# Patient Record
Sex: FEMALE | Race: WHITE | NOT HISPANIC OR LATINO | URBAN - METROPOLITAN AREA
[De-identification: names, ages, dates, MRNs, and addresses within clinical notes are randomized per-mention and may not be internally consistent; named-entity substitution may affect disease eponyms.]

---

## 2022-12-24 ENCOUNTER — EMERGENCY (EMERGENCY)
Facility: HOSPITAL | Age: 47
LOS: 1 days | Discharge: HOME | End: 2022-12-24
Attending: INTERNAL MEDICINE | Admitting: INTERNAL MEDICINE
Payer: MEDICAID

## 2022-12-24 VITALS
SYSTOLIC BLOOD PRESSURE: 160 MMHG | HEART RATE: 105 BPM | DIASTOLIC BLOOD PRESSURE: 117 MMHG | WEIGHT: 119.93 LBS | TEMPERATURE: 98 F | OXYGEN SATURATION: 98 % | HEIGHT: 64 IN | RESPIRATION RATE: 17 BRPM

## 2022-12-24 VITALS
DIASTOLIC BLOOD PRESSURE: 91 MMHG | OXYGEN SATURATION: 98 % | HEART RATE: 86 BPM | RESPIRATION RATE: 16 BRPM | SYSTOLIC BLOOD PRESSURE: 150 MMHG

## 2022-12-24 DIAGNOSIS — Z41.9 ENCOUNTER FOR PROCEDURE FOR PURPOSES OTHER THAN REMEDYING HEALTH STATE, UNSPECIFIED: Chronic | ICD-10-CM

## 2022-12-24 LAB
ALBUMIN SERPL ELPH-MCNC: 4.9 G/DL — SIGNIFICANT CHANGE UP (ref 3.3–5)
ALP SERPL-CCNC: 66 U/L — SIGNIFICANT CHANGE UP (ref 40–120)
ALT FLD-CCNC: 14 U/L — SIGNIFICANT CHANGE UP (ref 12–78)
ANION GAP SERPL CALC-SCNC: 11 MMOL/L — SIGNIFICANT CHANGE UP (ref 5–17)
AST SERPL-CCNC: 12 U/L — LOW (ref 15–37)
BILIRUB SERPL-MCNC: 1.3 MG/DL — HIGH (ref 0.2–1.2)
BUN SERPL-MCNC: 20 MG/DL — SIGNIFICANT CHANGE UP (ref 7–23)
CALCIUM SERPL-MCNC: 9.2 MG/DL — SIGNIFICANT CHANGE UP (ref 8.5–10.1)
CHLORIDE SERPL-SCNC: 104 MMOL/L — SIGNIFICANT CHANGE UP (ref 96–108)
CO2 SERPL-SCNC: 23 MMOL/L — SIGNIFICANT CHANGE UP (ref 22–31)
CREAT SERPL-MCNC: 0.61 MG/DL — SIGNIFICANT CHANGE UP (ref 0.5–1.3)
EGFR: 110 ML/MIN/1.73M2 — SIGNIFICANT CHANGE UP
GLUCOSE SERPL-MCNC: 150 MG/DL — HIGH (ref 70–99)
HCG SERPL-ACNC: <1 MIU/ML — SIGNIFICANT CHANGE UP
HCT VFR BLD CALC: 41 % — SIGNIFICANT CHANGE UP (ref 34.5–45)
HGB BLD-MCNC: 13.5 G/DL — SIGNIFICANT CHANGE UP (ref 11.5–15.5)
MCHC RBC-ENTMCNC: 28.8 PG — SIGNIFICANT CHANGE UP (ref 27–34)
MCHC RBC-ENTMCNC: 32.9 GM/DL — SIGNIFICANT CHANGE UP (ref 32–36)
MCV RBC AUTO: 87.6 FL — SIGNIFICANT CHANGE UP (ref 80–100)
NRBC # BLD: 0 /100 WBCS — SIGNIFICANT CHANGE UP (ref 0–0)
PLATELET # BLD AUTO: 335 K/UL — SIGNIFICANT CHANGE UP (ref 150–400)
POTASSIUM SERPL-MCNC: 3 MMOL/L — LOW (ref 3.5–5.3)
POTASSIUM SERPL-SCNC: 3 MMOL/L — LOW (ref 3.5–5.3)
PROT SERPL-MCNC: 8 G/DL — SIGNIFICANT CHANGE UP (ref 6–8.3)
RBC # BLD: 4.68 M/UL — SIGNIFICANT CHANGE UP (ref 3.8–5.2)
RBC # FLD: 13.6 % — SIGNIFICANT CHANGE UP (ref 10.3–14.5)
SARS-COV-2 RNA SPEC QL NAA+PROBE: SIGNIFICANT CHANGE UP
SODIUM SERPL-SCNC: 138 MMOL/L — SIGNIFICANT CHANGE UP (ref 135–145)
TROPONIN I, HIGH SENSITIVITY RESULT: 24.1 NG/L — SIGNIFICANT CHANGE UP
WBC # BLD: 14.38 K/UL — HIGH (ref 3.8–10.5)
WBC # FLD AUTO: 14.38 K/UL — HIGH (ref 3.8–10.5)

## 2022-12-24 PROCEDURE — 84484 ASSAY OF TROPONIN QUANT: CPT

## 2022-12-24 PROCEDURE — 93010 ELECTROCARDIOGRAM REPORT: CPT

## 2022-12-24 PROCEDURE — 70450 CT HEAD/BRAIN W/O DYE: CPT | Mod: MA

## 2022-12-24 PROCEDURE — 84702 CHORIONIC GONADOTROPIN TEST: CPT

## 2022-12-24 PROCEDURE — 99285 EMERGENCY DEPT VISIT HI MDM: CPT | Mod: 25

## 2022-12-24 PROCEDURE — 80053 COMPREHEN METABOLIC PANEL: CPT

## 2022-12-24 PROCEDURE — 85027 COMPLETE CBC AUTOMATED: CPT

## 2022-12-24 PROCEDURE — 93005 ELECTROCARDIOGRAM TRACING: CPT

## 2022-12-24 PROCEDURE — 96375 TX/PRO/DX INJ NEW DRUG ADDON: CPT

## 2022-12-24 PROCEDURE — 70450 CT HEAD/BRAIN W/O DYE: CPT | Mod: 26,MA

## 2022-12-24 PROCEDURE — 36415 COLL VENOUS BLD VENIPUNCTURE: CPT

## 2022-12-24 PROCEDURE — 87635 SARS-COV-2 COVID-19 AMP PRB: CPT

## 2022-12-24 PROCEDURE — 96374 THER/PROPH/DIAG INJ IV PUSH: CPT

## 2022-12-24 PROCEDURE — 99285 EMERGENCY DEPT VISIT HI MDM: CPT

## 2022-12-24 RX ORDER — POTASSIUM CHLORIDE 20 MEQ
40 PACKET (EA) ORAL ONCE
Refills: 0 | Status: COMPLETED | OUTPATIENT
Start: 2022-12-24 | End: 2022-12-24

## 2022-12-24 RX ORDER — SUMATRIPTAN SUCCINATE 4 MG/.5ML
6 INJECTION, SOLUTION SUBCUTANEOUS
Qty: 1 | Refills: 0
Start: 2022-12-24

## 2022-12-24 RX ORDER — HYDRALAZINE HCL 50 MG
10 TABLET ORAL ONCE
Refills: 0 | Status: COMPLETED | OUTPATIENT
Start: 2022-12-24 | End: 2022-12-24

## 2022-12-24 RX ORDER — MORPHINE SULFATE 50 MG/1
4 CAPSULE, EXTENDED RELEASE ORAL ONCE
Refills: 0 | Status: DISCONTINUED | OUTPATIENT
Start: 2022-12-24 | End: 2022-12-24

## 2022-12-24 RX ORDER — OXYCODONE AND ACETAMINOPHEN 5; 325 MG/1; MG/1
1 TABLET ORAL
Qty: 20 | Refills: 0
Start: 2022-12-24 | End: 2022-12-28

## 2022-12-24 RX ORDER — ONDANSETRON 8 MG/1
4 TABLET, FILM COATED ORAL ONCE
Refills: 0 | Status: COMPLETED | OUTPATIENT
Start: 2022-12-24 | End: 2022-12-24

## 2022-12-24 RX ORDER — METOCLOPRAMIDE HCL 10 MG
10 TABLET ORAL ONCE
Refills: 0 | Status: COMPLETED | OUTPATIENT
Start: 2022-12-24 | End: 2022-12-24

## 2022-12-24 RX ORDER — AMLODIPINE BESYLATE 2.5 MG/1
1 TABLET ORAL
Qty: 30 | Refills: 0
Start: 2022-12-24 | End: 2023-01-22

## 2022-12-24 RX ADMIN — MORPHINE SULFATE 4 MILLIGRAM(S): 50 CAPSULE, EXTENDED RELEASE ORAL at 05:44

## 2022-12-24 RX ADMIN — MORPHINE SULFATE 4 MILLIGRAM(S): 50 CAPSULE, EXTENDED RELEASE ORAL at 05:14

## 2022-12-24 RX ADMIN — Medication 10 MILLIGRAM(S): at 05:14

## 2022-12-24 RX ADMIN — Medication 40 MILLIEQUIVALENT(S): at 06:32

## 2022-12-24 RX ADMIN — ONDANSETRON 4 MILLIGRAM(S): 8 TABLET, FILM COATED ORAL at 05:14

## 2022-12-24 RX ADMIN — Medication 10 MILLIGRAM(S): at 06:36

## 2022-12-24 NOTE — ED PROVIDER NOTE - CLINICAL SUMMARY MEDICAL DECISION MAKING FREE TEXT BOX
migraine headache with accelerated HTN, PO sumatriptan was not working at home, BP became accelerated, patient was noncompliant with Amlodipine, treated with IVF, analgesia IV hydralazine CT and  ekg were normal discharged in stable condition with O/P referrals

## 2022-12-24 NOTE — ED PROVIDER NOTE - PROVIDER TOKENS
PROVIDER:[TOKEN:[[id number]],FOLLOWUP:[1-3 Days]],PROVIDER:[TOKEN:[[id number]],FOLLOWUP:[1-3 Days]]

## 2022-12-24 NOTE — ED PROVIDER NOTE - NSFOLLOWUPINSTRUCTIONS_ED_ALL_ED_FT
Follow up with the cardiology referral given for the Hypertension  Also neurology referral as needed also given     Hypertension, commonly called high blood pressure, is when the force of blood pumping through your arteries is too strong. Hypertension forces your heart to work harder to pump blood. Your arteries may become narrow or stiff. Having untreated or uncontrolled hypertension for a long period of time can cause heart attack, stroke, kidney disease, and other problems. If started on a medication, take exactly as prescribed by your health care professional. Maintain a healthy lifestyle and follow up with your primary care physician.    SEEK IMMEDIATE MEDICAL CARE IF YOU HAVE ANY OF THE FOLLOWING SYMPTOMS: severe headache, confusion, chest pain, abdominal pain, vomiting, or shortness of breath.

## 2022-12-24 NOTE — ED ADULT NURSE NOTE - OBJECTIVE STATEMENT
Patient received complaining of headache/migraines x3 days with some dizziness at this time. Patient denies nausea vomiting diarrhea constipation, states has been takingher migraine medications with no improvement. Denies SOB, CP, abdominal pain. Patient is AOx4, ambulatory, safety precautions in place, awaiting evaluation.

## 2022-12-24 NOTE — ED PROVIDER NOTE - PATIENT PORTAL LINK FT
You can access the FollowMyHealth Patient Portal offered by [facility] by registering at the following website: [email or web address]. By joining [employer]’s FollowMyHealth portal, you will also be able to view your health information using other applications (apps) compatible with our system.

## 2022-12-24 NOTE — ED PROVIDER NOTE - CARE PROVIDER_API CALL
Enrique Barcenas)  Internal Medicine  [address] [place] [address]  Phone: [phone]  Fax: [phone]  Follow Up Time: 1-3 Days    Juan Lewis  NEUROLOGY  [address] [place] [address]  Phone: [phone]  Fax: [phone]  Follow Up Time: 1-3 Days

## 2022-12-24 NOTE — ED PROVIDER NOTE - OBJECTIVE STATEMENT
46 y/o female N/O HTN, migraines C/C having a migraine headache since Thursday, sumatriptan not working , she has been off her  BP med, amlodipine,  because her BP improved, the BP is now accelerated , no CP, no SOB, no focal neurologic changes

## 2022-12-24 NOTE — ED ADULT NURSE NOTE - CAS TRG GENERAL NORM CIRC DET
Strong peripheral pulses/Capillary refill less/equal to 2 seconds Patient lives alone (Reports that family and friends will be around to assist when patient comes home) in an apartment with no steps to enter. Once inside, the patient has an elevator that takes the patient to the main floor where the apartment. The patients bathroom has a tub/shower combination, fixed shower head, regular toilet and no DME. The patient ambulates with a cane/rolling walker and does not own any other devices for ambulation. The patient is R handed, does not drive and wears glasses for reading.

## 2022-12-25 ENCOUNTER — TRANSCRIPTION ENCOUNTER (OUTPATIENT)
Age: 47
End: 2022-12-25

## 2023-11-01 NOTE — ED ADULT NURSE NOTE - NURSING ED SKIN COLOR
0908 REC'D TO PACU IN STABLE COND. PT SLEEPING, WAKES TO VOICE. CONNECTED TO BP CUFF , EKG LEADS & SPO2 MONITORS. VS STABLE PT RESTING WELL.NO DISTRESS NOTED @ THIS TIME. WILL CONT TO MONITOR.      0940 TRANSFERRED PT BACK TO ROOM 14 IN STABLE COND. BEDSIDE REPORT GIVEN TO LATANYA NICE RN. NO DISTRESS NOTED @ THIS TIME. VS STABLE  96 % 74  122/74   normal for race

## 2024-09-05 ENCOUNTER — EMERGENCY (EMERGENCY)
Facility: HOSPITAL | Age: 49
LOS: 1 days | Discharge: HOME | End: 2024-09-05
Attending: STUDENT IN AN ORGANIZED HEALTH CARE EDUCATION/TRAINING PROGRAM | Admitting: STUDENT IN AN ORGANIZED HEALTH CARE EDUCATION/TRAINING PROGRAM
Payer: COMMERCIAL

## 2024-09-05 VITALS
SYSTOLIC BLOOD PRESSURE: 141 MMHG | TEMPERATURE: 98 F | HEART RATE: 79 BPM | RESPIRATION RATE: 18 BRPM | OXYGEN SATURATION: 96 % | DIASTOLIC BLOOD PRESSURE: 91 MMHG

## 2024-09-05 VITALS
HEIGHT: 64 IN | RESPIRATION RATE: 20 BRPM | SYSTOLIC BLOOD PRESSURE: 169 MMHG | OXYGEN SATURATION: 98 % | WEIGHT: 110.01 LBS | TEMPERATURE: 98 F | DIASTOLIC BLOOD PRESSURE: 101 MMHG | HEART RATE: 93 BPM

## 2024-09-05 DIAGNOSIS — Z41.9 ENCOUNTER FOR PROCEDURE FOR PURPOSES OTHER THAN REMEDYING HEALTH STATE, UNSPECIFIED: Chronic | ICD-10-CM

## 2024-09-05 LAB
ALBUMIN SERPL ELPH-MCNC: 4.7 G/DL — SIGNIFICANT CHANGE UP (ref 3.3–5)
ALP SERPL-CCNC: 77 U/L — SIGNIFICANT CHANGE UP (ref 40–120)
ALT FLD-CCNC: 20 U/L — SIGNIFICANT CHANGE UP (ref 12–78)
ANION GAP SERPL CALC-SCNC: 8 MMOL/L — SIGNIFICANT CHANGE UP (ref 5–17)
AST SERPL-CCNC: 12 U/L — LOW (ref 15–37)
BASOPHILS # BLD AUTO: 0.03 K/UL — SIGNIFICANT CHANGE UP (ref 0–0.2)
BASOPHILS NFR BLD AUTO: 0.3 % — SIGNIFICANT CHANGE UP (ref 0–2)
BILIRUB SERPL-MCNC: 0.9 MG/DL — SIGNIFICANT CHANGE UP (ref 0.2–1.2)
BUN SERPL-MCNC: 11 MG/DL — SIGNIFICANT CHANGE UP (ref 7–23)
CALCIUM SERPL-MCNC: 9.4 MG/DL — SIGNIFICANT CHANGE UP (ref 8.5–10.1)
CHLORIDE SERPL-SCNC: 104 MMOL/L — SIGNIFICANT CHANGE UP (ref 96–108)
CO2 SERPL-SCNC: 26 MMOL/L — SIGNIFICANT CHANGE UP (ref 22–31)
CREAT SERPL-MCNC: 0.51 MG/DL — SIGNIFICANT CHANGE UP (ref 0.5–1.3)
EGFR: 114 ML/MIN/1.73M2 — SIGNIFICANT CHANGE UP
EOSINOPHIL # BLD AUTO: 0 K/UL — SIGNIFICANT CHANGE UP (ref 0–0.5)
EOSINOPHIL NFR BLD AUTO: 0 % — SIGNIFICANT CHANGE UP (ref 0–6)
GLUCOSE SERPL-MCNC: 91 MG/DL — SIGNIFICANT CHANGE UP (ref 70–99)
HCT VFR BLD CALC: 42.4 % — SIGNIFICANT CHANGE UP (ref 34.5–45)
HGB BLD-MCNC: 13.9 G/DL — SIGNIFICANT CHANGE UP (ref 11.5–15.5)
IMM GRANULOCYTES NFR BLD AUTO: 0.3 % — SIGNIFICANT CHANGE UP (ref 0–0.9)
LYMPHOCYTES # BLD AUTO: 2.91 K/UL — SIGNIFICANT CHANGE UP (ref 1–3.3)
LYMPHOCYTES # BLD AUTO: 27.7 % — SIGNIFICANT CHANGE UP (ref 13–44)
MAGNESIUM SERPL-MCNC: 2 MG/DL — SIGNIFICANT CHANGE UP (ref 1.6–2.6)
MCHC RBC-ENTMCNC: 30.1 PG — SIGNIFICANT CHANGE UP (ref 27–34)
MCHC RBC-ENTMCNC: 32.8 GM/DL — SIGNIFICANT CHANGE UP (ref 32–36)
MCV RBC AUTO: 91.8 FL — SIGNIFICANT CHANGE UP (ref 80–100)
MONOCYTES # BLD AUTO: 0.77 K/UL — SIGNIFICANT CHANGE UP (ref 0–0.9)
MONOCYTES NFR BLD AUTO: 7.3 % — SIGNIFICANT CHANGE UP (ref 2–14)
NEUTROPHILS # BLD AUTO: 6.75 K/UL — SIGNIFICANT CHANGE UP (ref 1.8–7.4)
NEUTROPHILS NFR BLD AUTO: 64.4 % — SIGNIFICANT CHANGE UP (ref 43–77)
NRBC # BLD: 0 /100 WBCS — SIGNIFICANT CHANGE UP (ref 0–0)
NT-PROBNP SERPL-SCNC: 266 PG/ML — HIGH (ref 0–125)
PLATELET # BLD AUTO: 293 K/UL — SIGNIFICANT CHANGE UP (ref 150–400)
POTASSIUM SERPL-MCNC: 3.7 MMOL/L — SIGNIFICANT CHANGE UP (ref 3.5–5.3)
POTASSIUM SERPL-SCNC: 3.7 MMOL/L — SIGNIFICANT CHANGE UP (ref 3.5–5.3)
PROT SERPL-MCNC: 7.8 G/DL — SIGNIFICANT CHANGE UP (ref 6–8.3)
RBC # BLD: 4.62 M/UL — SIGNIFICANT CHANGE UP (ref 3.8–5.2)
RBC # FLD: 12.1 % — SIGNIFICANT CHANGE UP (ref 10.3–14.5)
SODIUM SERPL-SCNC: 138 MMOL/L — SIGNIFICANT CHANGE UP (ref 135–145)
TROPONIN I, HIGH SENSITIVITY RESULT: 12.9 NG/L — SIGNIFICANT CHANGE UP
TSH SERPL-MCNC: 1.46 UIU/ML — SIGNIFICANT CHANGE UP (ref 0.36–3.74)
WBC # BLD: 10.49 K/UL — SIGNIFICANT CHANGE UP (ref 3.8–10.5)
WBC # FLD AUTO: 10.49 K/UL — SIGNIFICANT CHANGE UP (ref 3.8–10.5)

## 2024-09-05 PROCEDURE — 83735 ASSAY OF MAGNESIUM: CPT

## 2024-09-05 PROCEDURE — 83880 ASSAY OF NATRIURETIC PEPTIDE: CPT

## 2024-09-05 PROCEDURE — 84443 ASSAY THYROID STIM HORMONE: CPT

## 2024-09-05 PROCEDURE — 99284 EMERGENCY DEPT VISIT MOD MDM: CPT

## 2024-09-05 PROCEDURE — 85025 COMPLETE CBC W/AUTO DIFF WBC: CPT

## 2024-09-05 PROCEDURE — 93010 ELECTROCARDIOGRAM REPORT: CPT

## 2024-09-05 PROCEDURE — 84436 ASSAY OF TOTAL THYROXINE: CPT

## 2024-09-05 PROCEDURE — 71045 X-RAY EXAM CHEST 1 VIEW: CPT | Mod: 26

## 2024-09-05 PROCEDURE — 71045 X-RAY EXAM CHEST 1 VIEW: CPT

## 2024-09-05 PROCEDURE — 93005 ELECTROCARDIOGRAM TRACING: CPT

## 2024-09-05 PROCEDURE — 36415 COLL VENOUS BLD VENIPUNCTURE: CPT

## 2024-09-05 PROCEDURE — 99285 EMERGENCY DEPT VISIT HI MDM: CPT | Mod: 25

## 2024-09-05 PROCEDURE — 80053 COMPREHEN METABOLIC PANEL: CPT

## 2024-09-05 PROCEDURE — 84484 ASSAY OF TROPONIN QUANT: CPT

## 2024-09-05 NOTE — ED ADULT NURSE NOTE - ED CARDIAC RHYTHM
Mother of patient called, she had planned on being discharged home and following the results of the COVID test on my chart, she states she never received email to access [employer] as a proxy. I called the lab to verify the RSV results as we cannot verify them through the computer on discharge patients, RSV test is negative, COVID-19 test is pending. I provided this information after the patient's mother verified his birthdate for confirmation that she is his mother. We will give the patient's email to registration to resend the email so they can access [employer] at home. Counseled mother to follow-up as previously directed and that the results of COVID-19 test should be back sometime later today or tomorrow on her [employer] application.
Pt discharged home with parent/guardian. Pt acting age appropriately, respirations regular and unlabored, cap refill less than two seconds. Skin pink, dry and warm. Lungs clear bilaterally. No further complaints at this time. Parent/guardian verbalized understanding of discharge paperwork and has no further questions at this time. Education provided about continuation of care, follow up care and medication administration:Reviewed prescription with mom. Discussed fever control with mom. Discussed follow up with PCP and when to return to E for  worsening of symptoms. Parent/guardian able to provided teach back about discharge instructions.
regular

## 2024-09-05 NOTE — ED ADULT NURSE NOTE - ED STAT RN HANDOFF DETAILS
D/c instructions reviewed, verbalized understanding. Pt mentioned stopping an antidepressant, RN strongly encouraged continuing all meds as prescribed as abruptly stopping these meds may have significant adverse effects. Pt verbalized understanding and will reach out to her psychiatrist in the morning. VS stable, IV removed. Pt ambulatory, left ED in stable condition.

## 2024-09-05 NOTE — ED PROVIDER NOTE - OBJECTIVE STATEMENT
49-year-old female history of anxiety depression presents to the ER from urgent care for concern of serotonin syndrome.  Patient said that she has chronic anxiety and panic attacks and palpitations but they have worsened in the past 2 days since she doubled up on her Wellbutrin dose at the discretion of her psychiatrist.  Her last dose of this was last night.  She connected to her worsening symptoms so she said she is not taking it anymore.  She called her psychiatrist who agreed that she should not take these medicines anymore.  She is also on sumatriptan hand which is why urgent care was concerned about serotonin syndrome.  Patient denies chest pain.  Denies LOC diaphoresis vomiting diarrhea.  Denies abdominal pain pain.  Does not use drugs marijuana tobacco or alcohol.  Occasionally uses Xanax for anxiety.

## 2024-09-05 NOTE — ED ADULT TRIAGE NOTE - CHIEF COMPLAINT QUOTE
for the past day and a half, I have been having panic attacks, palpitations etc.  I went to urgent care and told him the medications I take and he is concerned (serotonin syndrome).  I still feel panicky.  I still palpitations and my blood pressure has been high (takes Sumatriptan and Naprosyn daily but has not taken her Amlodipine over the last 4 days- states she has been careless   No desire to harm myself or to harm others.  I just have panic attacks from not feeling well, and I can't answer why I haven't taken my Amlodipine,, just didn't  there is no other medication that I am supposed to take daily that I have not. (I did start an anti depressant (Oveliti) about a week ago and just increased to tablets (at doc's rec) 4 days ago.

## 2024-09-05 NOTE — ED ADULT NURSE REASSESSMENT NOTE - NS ED NURSE REASSESS COMMENT FT1
Report rec'd for pt in ED for anxiety. Pt does not exhibit SI/HI at this time. Calm and relaxed affect. Awaiting orders. Pt on bedside monitor. Safety maintained, monitoring ongoing.

## 2024-09-05 NOTE — ED ADULT NURSE NOTE - OBJECTIVE STATEMENT
Pt ambulatory to ED c/o anxiety and palpitations. Pt states she has a hx of anxiety and has started a new dose last week, but states that over the past week her anxiety has gotten worse. Pt states she has palpitations and a headache. Pt denies any chest pain, SOB, n/v/d fever and chills. pt placed on bedside cardiac monitoring. EKG complete. pt resting in stretcher.

## 2024-09-05 NOTE — ED PROVIDER NOTE - PATIENT PORTAL LINK FT
You can access the FollowMyHealth Patient Portal offered by [facility] by registering at the following website: [email or web address]. By joining [employer]’s FollowMyHealth portal, you will also be able to view your health information using other applications (apps) compatible with our system.

## 2024-09-05 NOTE — ED PROVIDER NOTE - CLINICAL SUMMARY MEDICAL DECISION MAKING FREE TEXT BOX
Patient presents with palpitations and panic.  Well-appearing on exam.  No signs or symptoms of serotonin syndrome.  Vitals are normal.  Workup is negative in the ER.  Chest x-ray is clear troponin negative thyroid normal.  No clonus no hyperreflexia.  No diaphoresis no tremors.  Will discharge with psychiatry follow-up.

## 2024-09-05 NOTE — ED PROVIDER NOTE - NSFOLLOWUPINSTRUCTIONS_ED_ALL_ED_FT
Please follow with your psychiatrist.    Take all medications exactly as prescribed.    Follow-up with primary care doctor.

## 2024-09-05 NOTE — ED PROVIDER NOTE - PHYSICAL EXAMINATION
Bilateral patellar reflexes are normal.  There is no hyperreflexia.  There is no clonus.  No tachycardia.  No hypertension.  No diaphoresis.  No tremors.  No fasciculations.

## 2024-09-05 NOTE — ED PROVIDER NOTE - HIV OFFER
Problem: Falls - Risk of:  Goal: Will remain free from falls  Description: Will remain free from falls  Outcome: Ongoing  Goal: Absence of physical injury  Description: Absence of physical injury  Outcome: Ongoing     Problem: Pain:  Goal: Pain level will decrease  Description: Pain level will decrease  Outcome: Ongoing  Goal: Control of acute pain  Description: Control of acute pain  Outcome: Ongoing  Goal: Control of chronic pain  Description: Control of chronic pain  Outcome: Ongoing     Problem: Discharge Planning:  Goal: Discharged to appropriate level of care  Description: Discharged to appropriate level of care  Outcome: Ongoing Opt out

## 2024-09-06 ENCOUNTER — EMERGENCY (EMERGENCY)
Facility: HOSPITAL | Age: 49
LOS: 1 days | Discharge: HOME | End: 2024-09-06
Attending: STUDENT IN AN ORGANIZED HEALTH CARE EDUCATION/TRAINING PROGRAM | Admitting: STUDENT IN AN ORGANIZED HEALTH CARE EDUCATION/TRAINING PROGRAM
Payer: COMMERCIAL

## 2024-09-06 VITALS
HEART RATE: 109 BPM | OXYGEN SATURATION: 99 % | WEIGHT: 110.01 LBS | DIASTOLIC BLOOD PRESSURE: 85 MMHG | TEMPERATURE: 98 F | RESPIRATION RATE: 18 BRPM | HEIGHT: 64 IN | SYSTOLIC BLOOD PRESSURE: 123 MMHG

## 2024-09-06 VITALS
TEMPERATURE: 98 F | SYSTOLIC BLOOD PRESSURE: 130 MMHG | DIASTOLIC BLOOD PRESSURE: 79 MMHG | HEART RATE: 103 BPM | RESPIRATION RATE: 18 BRPM | OXYGEN SATURATION: 100 %

## 2024-09-06 DIAGNOSIS — Z41.9 ENCOUNTER FOR PROCEDURE FOR PURPOSES OTHER THAN REMEDYING HEALTH STATE, UNSPECIFIED: Chronic | ICD-10-CM

## 2024-09-06 LAB — T4 AB SER-ACNC: 10 UG/DL — SIGNIFICANT CHANGE UP (ref 4.6–12)

## 2024-09-06 PROCEDURE — 99284 EMERGENCY DEPT VISIT MOD MDM: CPT | Mod: 25

## 2024-09-06 PROCEDURE — 99284 EMERGENCY DEPT VISIT MOD MDM: CPT

## 2024-09-06 PROCEDURE — 96375 TX/PRO/DX INJ NEW DRUG ADDON: CPT

## 2024-09-06 PROCEDURE — 96374 THER/PROPH/DIAG INJ IV PUSH: CPT

## 2024-09-06 RX ORDER — SODIUM CHLORIDE 9 MG/ML
1000 INJECTION INTRAMUSCULAR; INTRAVENOUS; SUBCUTANEOUS ONCE
Refills: 0 | Status: COMPLETED | OUTPATIENT
Start: 2024-09-06 | End: 2024-09-06

## 2024-09-06 RX ORDER — ACETAMINOPHEN 325 MG/1
750 TABLET ORAL ONCE
Refills: 0 | Status: DISCONTINUED | OUTPATIENT
Start: 2024-09-06 | End: 2024-09-06

## 2024-09-06 RX ORDER — ACETAMINOPHEN 325 MG/1
1000 TABLET ORAL ONCE
Refills: 0 | Status: COMPLETED | OUTPATIENT
Start: 2024-09-06 | End: 2024-09-06

## 2024-09-06 RX ORDER — DIPHENHYDRAMINE HCL 50 MG
25 CAPSULE ORAL ONCE
Refills: 0 | Status: COMPLETED | OUTPATIENT
Start: 2024-09-06 | End: 2024-09-06

## 2024-09-06 RX ORDER — METOCLOPRAMIDE HCL 5 MG
10 TABLET ORAL ONCE
Refills: 0 | Status: COMPLETED | OUTPATIENT
Start: 2024-09-06 | End: 2024-09-06

## 2024-09-06 RX ADMIN — Medication 25 MILLIGRAM(S): at 21:34

## 2024-09-06 RX ADMIN — SODIUM CHLORIDE 1000 MILLILITER(S): 9 INJECTION INTRAMUSCULAR; INTRAVENOUS; SUBCUTANEOUS at 21:29

## 2024-09-06 RX ADMIN — Medication 10 MILLIGRAM(S): at 21:33

## 2024-09-06 RX ADMIN — ACETAMINOPHEN 400 MILLIGRAM(S): 325 TABLET ORAL at 21:55

## 2024-09-06 NOTE — ED PROVIDER NOTE - NSFOLLOWUPINSTRUCTIONS_ED_ALL_ED_FT
Follow up with neurologist and psychiatrist  return to er for any worsening symptoms     Acute Headache    WHAT YOU NEED TO KNOW:    An acute headache is pain or discomfort that may start suddenly and get worse quickly. You may have an acute headache only when you feel stress or eat certain foods. Other acute headache pain can happen every day, and sometimes several times a day.  Headache Types    DISCHARGE INSTRUCTIONS:    Seek care immediately if:    You have severe pain.    You have numbness or weakness on one side of your face or body.    You have a headache that occurs after a blow to the head, a fall, or other trauma.    You have a headache, are forgetful or confused, or have trouble speaking.    You have a headache, stiff neck, and a fever.  Call your doctor if:    You have a constant headache and are vomiting.    You have a headache each day that does not get better, even after treatment.    You have changes in your headaches, or new symptoms that occur when you have a headache.    You have questions or concerns about your condition or care.  Medicines: You may need any of the following:    Prescription pain medicine may be given. The medicine your healthcare provider recommends will depend on the kind of headaches you have. You will need to take prescription headache medicines as directed to prevent a problem called rebound headache. These headaches happen with regular use of pain relievers for headache disorders.    NSAIDs, such as ibuprofen, help decrease swelling, pain, and fever. This medicine is available with or without a doctor's order. NSAIDs can cause stomach bleeding or kidney problems in certain people. If you take blood thinner medicine, always ask your healthcare provider if NSAIDs are safe for you. Always read the medicine label and follow directions.    Acetaminophen decreases pain and fever. It is available without a doctor's order. Ask how much to take and how often to take it. Follow directions. Read the labels of all other medicines you are using to see if they also contain acetaminophen, or ask your doctor or pharmacist. Acetaminophen can cause liver damage if not taken correctly.    Antidepressants may be given for some kinds of headaches.    Take your medicine as directed. Contact your healthcare provider if you think your medicine is not helping or if you have side effects. Tell your provider if you are allergic to any medicine. Keep a list of the medicines, vitamins, and herbs you take. Include the amounts, and when and why you take them. Bring the list or the pill bottles to follow-up visits. Carry your medicine list with you in case of an emergency.  Manage your symptoms:    Apply heat or ice on the headache area. Use a heat or ice pack. For an ice pack, you can also put crushed ice in a plastic bag. Cover the pack or bag with a towel before you apply it to your skin. Ice and heat both help decrease pain, and heat also helps decrease muscle spasms. Apply heat for 20 to 30 minutes every 2 hours. Apply ice for 15 to 20 minutes every hour. Apply heat or ice for as long and for as many days as directed. You may alternate heat and ice.    Relax your muscles. Lie down in a comfortable position and close your eyes. Relax your muscles slowly. Start at your toes and work your way up your body.    Keep a record of your headaches. Write down when your headaches start and stop. Include your symptoms and what you were doing when the headache began. Record what you ate or drank for 24 hours before the headache started. Describe the pain and where it hurts. Keep track of what you did to treat your headache and if it worked.  Prevent an acute headache:    Avoid anything that triggers an acute headache. Examples include exposure to chemicals, going to high altitude, or not getting enough sleep. Create a regular sleep routine. Go to sleep at the same time and wake up at the same time each day. Do not use electronic devices before bedtime. These may trigger a headache or prevent you from sleeping well.    Do not smoke. Nicotine and other chemicals in cigarettes and cigars can trigger an acute headache or make it worse. Ask your healthcare provider for information if you currently smoke and need help to quit. E-cigarettes or smokeless tobacco still contain nicotine. Talk to your healthcare provider before you use these products.    Limit alcohol as directed. Alcohol can trigger an acute headache or make it worse. If you have cluster headaches, do not drink alcohol during an episode. For other types of headaches, ask your healthcare provider if it is safe for you to drink alcohol. Ask how much is safe for you to drink, and how often.    Exercise as directed. Exercise can reduce tension and help with headache pain. Aim for 30 minutes of physical activity on most days of the week. Your healthcare provider can help you create an exercise plan.    Eat a variety of healthy foods. Healthy foods include fruits, vegetables, low-fat dairy products, lean meats, fish, whole grains, and cooked beans. Your healthcare provider or dietitian can help you create meals plans if you need to avoid foods that trigger headaches.  Follow up with your healthcare provider as directed: Bring your headache record with you when you see your healthcare provider. Write down your questions so you remember to ask them during your visits.

## 2024-09-06 NOTE — ED PROVIDER NOTE - PATIENT PORTAL LINK FT
You can access the FollowMyHealth Patient Portal offered by [facility] by registering at the following website: [email or web address]. By joining [employer]’s FollowMyHealth portal, you will also be able to view your health information using other applications (apps) compatible with our system.

## 2024-09-06 NOTE — ED ADULT NURSE NOTE - OBJECTIVE STATEMENT
Pt is alert oriented X3, no acute distress, Pt presented to the ED for Headache. Pain score 9/10. Pt denies any  nausea, vomiting.  PMH migraine headache.  Meds given as ordered, steven inserted 20G RAC. Care plan explained, call bell in reach. Fall and safety precautions in  place

## 2024-09-06 NOTE — ED PROVIDER NOTE - PRINCIPAL DIAGNOSIS
Render Risk Assessment In Note?: no Detail Level: Simple Additional Notes: Pt requested rx to be sent to [facility] pharmacy. Headache

## 2024-09-06 NOTE — ED PROVIDER NOTE - CARE PLAN
1 Principal Discharge DX:	Headache  Secondary Diagnosis:	Anxiety   Principal Discharge DX:	Headache

## 2024-09-06 NOTE — ED PROVIDER NOTE - CLINICAL SUMMARY MEDICAL DECISION MAKING FREE TEXT BOX
I, Luis Soto MD, have seen and examined the patient on the date of service.  I agree with the SHANNON's assessment as written, with exceptions or additions as noted below or in a separate note.  Patient with a history of migraines, anxiety, depression is presenting with persistent headache.  States that this started yesterday, was seen in urgent care and sent to ED for evaluation.  Upon getting home, states that she is still having headache so came to ED again for evaluation.  States that she has been under a lot of stress recently and has been trying a new medication for this.  Denies any vomiting with this.  No neck pain.  States this feels similar to prior episodes though it is typically improved with her medications.  On exam here her NIH stroke scale is 0, no neurologic deficits noted.  States that she is feeling better after medications.  Suspect likely migraine versus tension headache.  Given reassuring exam, do not suspect intracranial pathology such as SAH or pseudotumor cerebri.  Patient with improving heart rate while here with IV fluids, states that she is comfortable with discharge.  Despite her tachycardia, it is improving from initial, given this, I am okay with discharge.  Patient will follow-up with her neurologist next month.  Plan to discharge patient. Return to ED precautions were discussed with the patient/family. All questions were answered. Luis Soto MD.

## 2024-09-06 NOTE — ED ADULT NURSE NOTE - NSFALLUNIVINTERV_ED_ALL_ED
Bed/Stretcher in lowest position, wheels locked, appropriate side rails in place/Call bell, personal items and telephone in reach/Instruct patient to call for assistance before getting out of bed/chair/stretcher/Non-slip footwear applied when patient is off stretcher/[place] to call system/Physically safe environment - no spills, clutter or unnecessary equipment/Purposeful proactive rounding/Room/bathroom lighting operational, light cord in reach

## 2024-09-06 NOTE — ED PROVIDER NOTE - OBJECTIVE STATEMENT
Patient is a 49-year-old female with past history of anxiety depression migraines on sumatriptan coming in for worsening headache since yesterday.  Patient states she was seen in the ER yesterday for concern over serotonin syndrome.  Patient states headache got worse after she was discharged.  Patient states she stopped her Wellbutrin after speaking to her psychiatrist.  Patient denies any suicidal homicidal ideations chest pain or shortness of breath patient does admit she is under a under a lot of stress and has been trying to manage her anxiety with her psychiatrist.  Patient denies any alcohol or drugs.  She denies any blurry vision nausea vomiting photophobia neck pain rash fever chills initially started on the right side nose diffuse frontal.  She states she has seen symptoms had CT scan and MRI.  Patient states headache is typical but increased in severity after she was

## 2025-04-28 ENCOUNTER — EMERGENCY (EMERGENCY)
Facility: HOSPITAL | Age: 50
LOS: 1 days | End: 2025-04-28
Attending: EMERGENCY MEDICINE | Admitting: STUDENT IN AN ORGANIZED HEALTH CARE EDUCATION/TRAINING PROGRAM
Payer: COMMERCIAL

## 2025-04-28 VITALS
DIASTOLIC BLOOD PRESSURE: 74 MMHG | OXYGEN SATURATION: 98 % | TEMPERATURE: 98 F | RESPIRATION RATE: 19 BRPM | SYSTOLIC BLOOD PRESSURE: 100 MMHG | HEIGHT: 64 IN | HEART RATE: 76 BPM | WEIGHT: 98.11 LBS

## 2025-04-28 DIAGNOSIS — Z41.9 ENCOUNTER FOR PROCEDURE FOR PURPOSES OTHER THAN REMEDYING HEALTH STATE, UNSPECIFIED: Chronic | ICD-10-CM

## 2025-04-28 LAB
ANION GAP SERPL CALC-SCNC: 12 MMOL/L — SIGNIFICANT CHANGE UP (ref 5–17)
APAP SERPL-MCNC: <2 UG/ML — LOW (ref 10–30)
APPEARANCE UR: CLEAR — SIGNIFICANT CHANGE UP
BASOPHILS # BLD AUTO: 0.02 K/UL — SIGNIFICANT CHANGE UP (ref 0–0.2)
BASOPHILS NFR BLD AUTO: 0.3 % — SIGNIFICANT CHANGE UP (ref 0–2)
BILIRUB UR-MCNC: NEGATIVE — SIGNIFICANT CHANGE UP
BUN SERPL-MCNC: 10 MG/DL — SIGNIFICANT CHANGE UP (ref 7–23)
CALCIUM SERPL-MCNC: 9.2 MG/DL — SIGNIFICANT CHANGE UP (ref 8.5–10.1)
CHLORIDE SERPL-SCNC: 105 MMOL/L — SIGNIFICANT CHANGE UP (ref 96–108)
CO2 SERPL-SCNC: 22 MMOL/L — SIGNIFICANT CHANGE UP (ref 22–31)
COLOR SPEC: YELLOW — SIGNIFICANT CHANGE UP
CREAT SERPL-MCNC: 0.62 MG/DL — SIGNIFICANT CHANGE UP (ref 0.5–1.3)
DIFF PNL FLD: NEGATIVE — SIGNIFICANT CHANGE UP
EGFR: 108 ML/MIN/1.73M2 — SIGNIFICANT CHANGE UP
EGFR: 108 ML/MIN/1.73M2 — SIGNIFICANT CHANGE UP
EOSINOPHIL # BLD AUTO: 0.12 K/UL — SIGNIFICANT CHANGE UP (ref 0–0.5)
EOSINOPHIL NFR BLD AUTO: 1.6 % — SIGNIFICANT CHANGE UP (ref 0–6)
GLUCOSE SERPL-MCNC: 77 MG/DL — SIGNIFICANT CHANGE UP (ref 70–99)
GLUCOSE UR QL: NEGATIVE MG/DL — SIGNIFICANT CHANGE UP
HCG SERPL-ACNC: <1 MIU/ML — SIGNIFICANT CHANGE UP
HCT VFR BLD CALC: 38.6 % — SIGNIFICANT CHANGE UP (ref 34.5–45)
HGB BLD-MCNC: 13.1 G/DL — SIGNIFICANT CHANGE UP (ref 11.5–15.5)
IMM GRANULOCYTES NFR BLD AUTO: 0.3 % — SIGNIFICANT CHANGE UP (ref 0–0.9)
KETONES UR-MCNC: >=160 MG/DL
LEUKOCYTE ESTERASE UR-ACNC: NEGATIVE — SIGNIFICANT CHANGE UP
LYMPHOCYTES # BLD AUTO: 2.24 K/UL — SIGNIFICANT CHANGE UP (ref 1–3.3)
LYMPHOCYTES # BLD AUTO: 29.1 % — SIGNIFICANT CHANGE UP (ref 13–44)
MCHC RBC-ENTMCNC: 30.5 PG — SIGNIFICANT CHANGE UP (ref 27–34)
MCHC RBC-ENTMCNC: 33.9 G/DL — SIGNIFICANT CHANGE UP (ref 32–36)
MCV RBC AUTO: 90 FL — SIGNIFICANT CHANGE UP (ref 80–100)
MONOCYTES # BLD AUTO: 0.48 K/UL — SIGNIFICANT CHANGE UP (ref 0–0.9)
MONOCYTES NFR BLD AUTO: 6.2 % — SIGNIFICANT CHANGE UP (ref 2–14)
NEUTROPHILS # BLD AUTO: 4.82 K/UL — SIGNIFICANT CHANGE UP (ref 1.8–7.4)
NEUTROPHILS NFR BLD AUTO: 62.5 % — SIGNIFICANT CHANGE UP (ref 43–77)
NITRITE UR-MCNC: NEGATIVE — SIGNIFICANT CHANGE UP
NRBC BLD AUTO-RTO: 0 /100 WBCS — SIGNIFICANT CHANGE UP (ref 0–0)
PCP SPEC-MCNC: SIGNIFICANT CHANGE UP
PH UR: 5 — SIGNIFICANT CHANGE UP (ref 5–8)
PLATELET # BLD AUTO: 308 K/UL — SIGNIFICANT CHANGE UP (ref 150–400)
POTASSIUM SERPL-MCNC: 3.6 MMOL/L — SIGNIFICANT CHANGE UP (ref 3.5–5.3)
POTASSIUM SERPL-SCNC: 3.6 MMOL/L — SIGNIFICANT CHANGE UP (ref 3.5–5.3)
PROT UR-MCNC: NEGATIVE MG/DL — SIGNIFICANT CHANGE UP
RBC # BLD: 4.29 M/UL — SIGNIFICANT CHANGE UP (ref 3.8–5.2)
RBC # FLD: 12.2 % — SIGNIFICANT CHANGE UP (ref 10.3–14.5)
SALICYLATES SERPL-MCNC: <1.7 MG/DL — LOW (ref 2.8–20)
SODIUM SERPL-SCNC: 139 MMOL/L — SIGNIFICANT CHANGE UP (ref 135–145)
SP GR SPEC: 1.02 — SIGNIFICANT CHANGE UP (ref 1–1.03)
UROBILINOGEN FLD QL: 0.2 MG/DL — SIGNIFICANT CHANGE UP (ref 0.2–1)
WBC # BLD: 7.7 K/UL — SIGNIFICANT CHANGE UP (ref 3.8–10.5)
WBC # FLD AUTO: 7.7 K/UL — SIGNIFICANT CHANGE UP (ref 3.8–10.5)

## 2025-04-28 PROCEDURE — 84702 CHORIONIC GONADOTROPIN TEST: CPT

## 2025-04-28 PROCEDURE — 99285 EMERGENCY DEPT VISIT HI MDM: CPT

## 2025-04-28 PROCEDURE — 36415 COLL VENOUS BLD VENIPUNCTURE: CPT

## 2025-04-28 PROCEDURE — 81003 URINALYSIS AUTO W/O SCOPE: CPT

## 2025-04-28 PROCEDURE — 80048 BASIC METABOLIC PNL TOTAL CA: CPT

## 2025-04-28 PROCEDURE — 99284 EMERGENCY DEPT VISIT MOD MDM: CPT | Mod: 25

## 2025-04-28 PROCEDURE — 71045 X-RAY EXAM CHEST 1 VIEW: CPT | Mod: 26

## 2025-04-28 PROCEDURE — 71045 X-RAY EXAM CHEST 1 VIEW: CPT

## 2025-04-28 PROCEDURE — 90792 PSYCH DIAG EVAL W/MED SRVCS: CPT | Mod: 95

## 2025-04-28 PROCEDURE — 85025 COMPLETE CBC W/AUTO DIFF WBC: CPT

## 2025-04-28 PROCEDURE — 80307 DRUG TEST PRSMV CHEM ANLYZR: CPT

## 2025-04-28 NOTE — ED ADULT NURSE NOTE - NSFALLHARMRISKINTERV_ED_ALL_ED

## 2025-04-28 NOTE — ED BEHAVIORAL HEALTH ASSESSMENT NOTE - HPI (INCLUDE ILLNESS QUALITY, SEVERITY, DURATION, TIMING, CONTEXT, MODIFYING FACTORS, ASSOCIATED SIGNS AND SYMPTOMS)
Pt is a 49 yo F, domiciled with adult daughter and partner, employed as a professor, PMH significant for perimenopause on estrogen/progesterone, HTN, with PPHx of anxiety with self-reported panic attacks, no prior psych admissions, no pSA/NSSIB, no aggression, +recent MJ use, in outpt tx with a Psych NP Johnie Jaramillo([phone]), on Trintellix 5 mg and Xanax PRN, who self-presents to the ED for worsening anxiety and panic symptoms.    [facility] obtained collateral from pt's adult daughter - see  note for details. In brief, collateral states the pt brought herself to the ED due to worsening anxiety and panic attacks with poor sleep/appetite in the setting of med changes and perimenopause. However, collateral denies recent SI/HI, suicidal/aggressive behaviors, or acute safety concerns. Collateral thinks the pt would benefit from being connected with a therapist, but feels safe with the pt returning home if psych cleared. Pt is a 49 yo F, domiciled with adult daughter and brother, employed as a professor, PMH significant for perimenopause on estrogen/progesterone, HTN, and Migraine, with PPHx of anxiety with self-reported panic attacks, no prior psych admissions, no pSA/NSSIB, no aggression, +recent MJ use, in outpt tx with a Psych NP Ella Jett([phone]), on Trintellix 5 mg, Remeron 15 mg, and Xanax PRN, who self-presents to the ED for worsening anxiety and panic symptoms.    [facility] obtained collateral from pt's adult daughter - see  note for details. In brief, collateral states the pt brought herself to the ED due to worsening anxiety and panic attacks with poor sleep/appetite in the setting of med changes and perimenopause. However, collateral denies recent SI/HI, suicidal/aggressive behaviors, or acute safety concerns. Collateral thinks the pt would benefit from being connected with a therapist, but feels safe with the pt returning home if psych cleared.    On assessment, the pt is A&O x4, presents as anxious and constricted, but is overall cooperative, linear, and future-oriented, with no e/o internal preoccupation, and states she brought herself to the ED tonight to discuss possible inpatient admission to have her meds adjusted more quickly to target her worsening anxiety and depressive symptoms. The pt states she was functioning at baseline up until she began menopause last June, since when she's been feeling more anxious and depressed, has had poor appetite and sleep, has difficulty leaving her home out of fear she will have a panic attack, and has had intermittent passive SI in the context of panic symptoms. The pt states she has been actively working with her psych NP to try adjusting her medications, but states the adjustments have not improved her symptoms, and has recently been taking more xanax and also using MJ to try to treat her intense anxiety. The pt states her Psych NP recently reduced her Trintellix back down to 5 mg a few days ago, but states this med adjustment exacerbated her panic symptoms, prompting her to come to the ED to discuss a possible admission. The pt states that despite her recent anxiety and depressive symptoms, she's continued to function at work, denies any active SI, has continued to have a strong desire to live, denies recent HI or aggressive tendencies, and has not experienced any symptoms of psychosis or jay. This writer discussed the potential benefits of inpatient psychiatric hospitalization and offered this to the pt, but after further discussion she states she prefers to return home tonight and to f/u with her outpt psych NP. The pt is also amenable to receiving outpt therapy referrals and confirms she will return to the ED for acute safety concerns or if her symptoms worsen.    This writer left a VM for the pt's psychiatric NP updating her the pt presented to the ED, that she is psych cleared, and instructing she f/u with the pt ASAP.

## 2025-04-28 NOTE — ED PROVIDER NOTE - OBJECTIVE STATEMENT
50-year-old female past medical history of perimenopausal on progesterone and estrogen, hypertension, anxiety depression presents to the ED with complaints of worsening anxiety and panic attacks X 2 to 3 months got worse over the past few days.  Patient has been following with her NP psychiatrist Johnie Jaramillo [phone].  She was on Trintillex 10mg for the past 1 month, but was having a lot of side effects so she reduced her dose to 5 mg 3 days ago by herself.  She is not sure if that is what caused her anxiety to worsen.  Also admits she was smoking marijuana over the past few weeks to help with the anxiety however it was not helping and she recently stopped yesterday.  She denies any nausea vomiting, abdominal pain, chest pain, shortness of breath, fever chills, URI symptoms, SI/HI/AH/VH. Patient reports she has been taking her Xanax 1 mg almost daily for the past 1 month which her psychiatrist knows about

## 2025-04-28 NOTE — ED ADULT NURSE REASSESSMENT NOTE - NS ED NURSE REASSESS COMMENT FT1
pt received, in no distress, pt sleeping in stretcher in direct view of nurses station, awakens with verbal stimulation, pt to give urine sample when ready

## 2025-04-28 NOTE — ED PROVIDER NOTE - PROGRESS NOTE DETAILS
SHAHNAZ Mcclain: pt cleared by telepsych, she was given the resources for therapists that was faxed over. will dc

## 2025-04-28 NOTE — ED PROVIDER NOTE - CLINICAL SUMMARY MEDICAL DECISION MAKING FREE TEXT BOX
50-year-old female with history of hypertension, anxiety/depression, menopausal presents with has been having worsening anxiety and panic attacks over the past few months.  The patient is seeing an outpatient psychiatrist, and is being treated.  The patient feels overwhelmed, and came to the ED for evaluation.  The patient has not had any suicidal or homicidal ideation.  No thoughts of hurting herself.   patient states that she does want to live, and does not have any thoughts of ending her life.  No weakness or dizziness.  No somatic complaints.  No other acute complaints at this time.  Exam: Nontoxic, well-appearing.  Normal respiratory effort.  Normal cardiac exam.  CTA BL, no W/R/R.  Abdomen soft, nontender, nondistended.  No HSM.  No CVAT.  Normal nonfocal neurologic exam.  No suicidal ideation.  No other acute findings.  Acute worsening anxiety/panic attacks without suicidal or homicidal ideation.  Will check labs, telepsychiatry evaluation, reeval

## 2025-04-28 NOTE — ED ADULT NURSE NOTE - CHIEF COMPLAINT
[Never] : Never The patient is a 50y Female complaining of psychiatric evaluation. [Yes] : Yes [2 - 4 times a month (2 pts)] : 2-4 times a month (2 points) [1 or 2 (0 pts)] : 1 or 2 (0 points) [Never (0 pts)] : Never (0 points) [No falls in past year] : Patient reported no falls in the past year [0] : 2) Feeling down, depressed, or hopeless: Not at all (0) [Good] : ~his/her~  mood as  good [Patient reported mammogram was normal] : Patient reported mammogram was normal [Patient reported PAP Smear was normal] : Patient reported PAP Smear was normal [Patient reported bone density results were normal] : Patient reported bone density results were normal [Patient reported colonoscopy was normal] : Patient reported colonoscopy was normal [HIV Test offered] : HIV Test offered [Hepatitis C test offered] : Hepatitis C test offered [None] : None [With Significant Other] : lives with significant other [Employed] : employed [College] : College [Single] : single [Feels Safe at Home] : Feels safe at home [Fully functional (bathing, dressing, toileting, transferring, walking, feeding)] : Fully functional (bathing, dressing, toileting, transferring, walking, feeding) [Fully functional (using the telephone, shopping, preparing meals, housekeeping, doing laundry, using] : Fully functional and needs no help or supervision to perform IADLs (using the telephone, shopping, preparing meals, housekeeping, doing laundry, using transportation, managing medications and managing finances) [Smoke Detector] : smoke detector [Carbon Monoxide Detector] : carbon monoxide detector [Seat Belt] :  uses seat belt [Sunscreen] : uses sunscreen [Caregiver Concerns] : has caregiver concerns [de-identified] : social [Audit-CScore] : 2 [de-identified] : walks [de-identified] : reg [EyeExamDate] : 2018 [Change in mental status noted] : No change in mental status noted [Language] : denies difficulty with language [Behavior] : denies difficulty with behavior [Learning/Retaining New Information] : denies difficulty learning/retaining new information [Handling Complex Tasks] : denies difficulty handling complex tasks [Reasoning] : denies difficulty with reasoning [Spatial Ability and Orientation] : denies difficulty with spatial ability and orientation [Sexually Active] : not sexually active [High Risk Behavior] : no high risk behavior [Reports changes in hearing] : Reports no changes in hearing [Reports changes in vision] : Reports no changes in vision [Reports normal functional visual acuity (ie: able to read med bottle)] : Reports poor functional visual acuity.  [Reports changes in dental health] : Reports no changes in dental health [Guns at Home] : no guns at home [Safety elements used in home] : no safety elements used in home [Travel to Developing Areas] : does not  travel to developing areas [TB Exposure] : is not being exposed to tuberculosis [MammogramDate] : 11/2022 [PapSmearDate] : 2018 [BoneDensityDate] : 2020 [ColonoscopyDate] : 2016

## 2025-04-28 NOTE — ED BEHAVIORAL HEALTH NOTE - BEHAVIORAL HEALTH NOTE
Collateral below has requested that the information provided remain confidential: Yes [  ] No [ x ]  Collateral below has provided information that patient is/may be unaware of: Yes [  ] No [ x ]  Patient gives permission to obtain collateral from _____:  (  ) Yes  (  )  No  Rationale for overriding objection            (  ) Lack of capacity. Details: ________            (  x) Assessing risk of danger to self/others.  Rationale for selecting specific collateral source            (  ) Potential to impact risk of danger to self/others and no alternative equivalent.    NAME: Sherin Ordonez  NUMBER:[phone]  RELATIONSHIP: Daughter  RELIABILITY: Reliable  COMMENTS: Daughter has no safety concerns for Pt or others if discharged home  ========================  PATIENT DEMOGRAPHICS:  ========================  HPI  BASELINE FUNCTIONING: Pt is a 50-year old female that lives with her daughter.  She is employed as a professor.  DATE HPI STARTED: 4/27/25  DECOMPENSATION: Daughter stated that Pt has panic attacks lasting 5 hours which are debilitating and causes her to vomit.  Pt told daughter that yesterday was the worse panic attack and anxiety that she ever felt.  Pt drove herself to the ED 4/28.  Daughter also said that Pt's boyfriend of 5 years is a great contributing factor to Pt's anxiety.  SUICIDALITY: Daughter denies  VIOLENCE: Daughter denies  SUBSTANCE: Daughter denies  ========================  PAST PSYCHIATRIC HISTORY  ========================  DATE PAST PSYCHIATRIC HISTORY STARTED: 8 months ago  MAIN PSYCHIATRIC DIAGNOSIS: Anxiety  PSYCHIATRIC HOSPITALIZATIONS: None  PRIOR ILLNESS:  Counselor did not explore  SUICIDALITY: Daughter denies  VIOLENCE: Daughter denies  SUBSTANCE USE: Daughter denies  ==============  OTHER HISTORY  ==============  CURRENT MEDICATION: Daughter does not know  MEDICAL HISTORY:  Counselor did not explore  ALLERGIES:  Counselor did not explore  LEGAL ISSUES: No hx  FIREARM ACCESS: None  SOCIAL HISTORY: Speaks to friends and family  FAMILY HISTORY: No family hx of mental illness  DEVELOPMENTAL HISTORY:  Counselor did not explore

## 2025-04-28 NOTE — ED PROVIDER NOTE - PATIENT PORTAL LINK FT
You can access the FollowMyHealth Patient Portal offered by [facility] by registering at the following website: [email or web address]. By joining [employer]’s FollowMyHealth portal, you will also be able to view your health information using other applications (apps) compatible with our system.

## 2025-04-28 NOTE — ED ADULT NURSE NOTE - NSHOSCREENINGQ1_ED_ALL_ED
Problem: Alcohol Withdrawal Management  Goal: # No alcohol-related delirium or seizures  Outcome: Outcome Met, Continue evaluating goal progress toward completion   01/25/18 2015   CIWA-Ar   Tactile Disturbances 0   Auditory Disturbances 0   Visual Disturbances 0       Comments: Patient isolative to room, reading a book during this shift. Patient is pleasant, calm and cooperative. Patient appears to be guarded and reduced expression. CIWA scores were both 3 during this shift for anxiety, sweats, and head ache (see flow sheet for details). BP elevated upon assessment at start of this shift at 1600 and then decreased to normal at 2015. Patient continues to be monitored per protocol.    No

## 2025-04-28 NOTE — ED PROVIDER NOTE - PHYSICAL EXAMINATION
Gen: Well appearing in NAD.   ENT: oral mucosa dry   Head: atraumatic  Heart: s1/s2, RRR  Lung: CTA b/l, no wheezing/rhonchi or rales.  Abd: soft, NT/ND,  Msk: Patient ambulatory in the ED  Neuro: AAO x3, patient moving all extremity equally,   Skin: Normal for race  Psych: Calm and cooperative

## 2025-04-28 NOTE — ED BEHAVIORAL HEALTH ASSESSMENT NOTE - SUMMARY
Pt is a 49 yo F, domiciled with adult daughter and brother, employed as a professor, PMH significant for perimenopause on estrogen/progesterone, HTN, and Migraine, with PPHx of anxiety with self-reported panic attacks, no prior psych admissions, no pSA/NSSIB, no aggression, +recent MJ use, in outpt tx with a Psych NP Ella Jett([phone]), on Trintellix 5 mg, Remeron 15 mg, and Xanax PRN, who self-presents to the ED for worsening anxiety and panic symptoms in the setting of beginning menopause ~1 yr ago, cannabis use, and recent medication adjustments. Given her hx and exam, her presentation is c/f MDD and panic disorder with agoraphobia. She poses a chronic risk of harm given her prior psych hx, reported mood and anxiety symptoms, recent substance use, and dissatisfaction with her current medication regimen. However, her risk is mitigated by the fact she presents as future-oriented and help-seeking, is able to safety plan, denies recent active SI, has no pSA/NSSIB, is engaged in treatment and in work, has remained in good behavioral control in the ED, denies access to firearms, and currently does not report or manifest symptoms of acute jay or psychosis. In light of these mitigating/protective factors, she does not present as an imminent risk of harm to self or others and does not meet criteria for involuntary psychiatric hospitalization. Although she declined voluntary admission when this was offered, her risk was further mitigated by engaging her in safety planning, providing her additional outpt therapy referrals, instructing her to f/u with her outpt psych NP, and instructing her to return to the ED for acute safety concerns or if her symptoms worsen. In addition, a VM was left for the pt's psychiatric NP updating her the pt presented to the ED, that she is psych cleared, and instructing she f/u with the pt ASAP. No psychiatric contraindication to discharge.

## 2025-04-28 NOTE — ED PROVIDER NOTE - NSFOLLOWUPINSTRUCTIONS_ED_ALL_ED_FT
Follow up with your psychiatrist within 2-3 days. Take the copy of your test results you were given in the emergency room for your doctor to review.     Follow up with the resources given to you for a therapist.     Stay hydrated    Return to the ER if your symptoms worsen or for any other medical emergencies

## 2025-04-28 NOTE — ED ADULT TRIAGE NOTE - CHIEF COMPLAINT QUOTE
pt ambulated into the ED without difficulty C/O increased anxiety and panic x2 days states she has recently decreased her trintilex form 10mg to 5mg. has been using PRN xanax with no relief.  denies SI, denies HI, denies auditory and visual hallucinations.

## 2025-04-28 NOTE — ED ADULT TRIAGE NOTE - RESPIRATORY RATE (BREATHS/MIN)
Patient to apply to surgical area (as instructed) the night before procedure and the AM of procedure.  MEASURED FOR TEDS/SCDS IN PAT    CALF MEASUREMENT    18.5  LENGTH MEASUREMENT 18.5     19

## 2025-04-29 DIAGNOSIS — F40.01 AGORAPHOBIA WITH PANIC DISORDER: ICD-10-CM

## 2025-04-29 DIAGNOSIS — F32.9 MAJOR DEPRESSIVE DISORDER, SINGLE EPISODE, UNSPECIFIED: ICD-10-CM
